# Patient Record
Sex: MALE | Race: WHITE
[De-identification: names, ages, dates, MRNs, and addresses within clinical notes are randomized per-mention and may not be internally consistent; named-entity substitution may affect disease eponyms.]

---

## 2019-04-16 ENCOUNTER — HOSPITAL ENCOUNTER (EMERGENCY)
Dept: HOSPITAL 56 - MW.ED | Age: 39
Discharge: HOME | End: 2019-04-16
Payer: MEDICAID

## 2019-04-16 VITALS — DIASTOLIC BLOOD PRESSURE: 76 MMHG | SYSTOLIC BLOOD PRESSURE: 124 MMHG

## 2019-04-16 DIAGNOSIS — Z79.899: ICD-10-CM

## 2019-04-16 DIAGNOSIS — M54.42: ICD-10-CM

## 2019-04-16 DIAGNOSIS — I25.2: ICD-10-CM

## 2019-04-16 DIAGNOSIS — M51.26: Primary | ICD-10-CM

## 2019-04-16 PROCEDURE — 96372 THER/PROPH/DIAG INJ SC/IM: CPT

## 2019-04-16 PROCEDURE — 99283 EMERGENCY DEPT VISIT LOW MDM: CPT

## 2019-04-16 RX ADMIN — KETOROLAC TROMETHAMINE ONE MG: 60 INJECTION, SOLUTION INTRAMUSCULAR at 09:49

## 2019-04-16 NOTE — EDM.PDOC
ED HPI GENERAL MEDICAL PROBLEM





- General


Chief Complaint: Lower Extremity Injury/Pain


Stated Complaint: LEFT LEG PAIN


Time Seen by Provider: 04/16/19 10:29


Source of Information: Reports: Patient





- History of Present Illness


INITIAL COMMENTS - FREE TEXT/NARRATIVE: 





HISTORY AND PHYSICAL:


History of present illness:


[]Patient presents with low back pain with history of disc bulge recent MRI 

within the last month performed in [place] he does have a copy of the MRI report 

he has sciatic distribution pain on the left 10 out of 10 and is not been able 

to sleep. 2 days while he has been with pain management here previously he 

moved to [place] for a short-term but has returned to [place] due to 

financial resources available last job availability





No footdrop saddle anesthesia bowel or urine symptoms





Review of systems: 


As per history of present illness and below otherwise all systems reviewed and 

negative.


Past medical history: 


As per history of present illness and as reviewed below otherwise 

noncontributory.


Surgical history: 


As per history of present illness and as reviewed below otherwise 

noncontributory.


Social history: 


No reported history of drug or alcohol abuse.


Family history: 


As per history of present illness and as reviewed below otherwise 

noncontributory.


Physical exam:


HEENT: Atraumatic, normocephalic, pupils reactive, negative for conjunctival 

pallor or scleral icterus, mucous membranes moist, throat clear, neck supple, 

nontender, trachea midline.


Lungs: Clear to auscultation, breath sounds equal bilaterally, chest nontender.


Heart: S1S2, regular, negative for clicks, rubs, or JVD.


Abdomen: Soft, nondistended, nontender. Negative for masses or 

hepatosplenomegaly. Negative for costovertebral tenderness.


Pelvis: Stable nontender.


Genitourinary: Deferred.


Rectal: Deferred.


Extremities: Atraumatic, negative for cords or calf pain. Neurovascular 

unremarkable.


Neuro: Awake, alert, oriented. Cranial nerves II through XII unremarkable. 

Cerebellum unremarkable. Motor and sensory unremarkable throughout. Exam 

nonfocal.


Diagnostics:


[I will make a copy of his MRI report for file


]


Therapeutics:


[Patient has had multiple neurology and neurosurgery consultation, he is going 

to follow [facility] with [facility] neuro[facility] department]


[place]


Medrol Dosepak





Impression: 


[Low back pain


Disc bulge L4-L5


Sciatic distribution pain secondary to above 


]


Definitive disposition and diagnosis as appropriate pending reevaluation and 

review of above.


  ** left leg


Pain Score (Numeric/FACES): 8





- Related Data


 Allergies











Allergy/AdvReac Type Severity Reaction Status Date / Time


 


No Known Allergies Allergy   Verified 04/16/19 09:42











Home Meds: 


 Home Meds





Lisinopril 1 tab PO DAILY 04/16/19 [History]


atorvaSTATin [Lipitor] 10 mg PO DAILY 04/16/19 [History]











Past Medical History


HEENT History: Reports: None


Cardiovascular History: Reports: MI


Respiratory History: Reports: None


Gastrointestinal History: Reports: None


Genitourinary History: Reports: None


Musculoskeletal History: Reports: None


Neurological History: Reports: None


Psychiatric History: Reports: None


Endocrine/Metabolic History: Reports: None


Hematologic History: Reports: None


Immunologic History: Reports: None


Oncologic (Cancer) History: Reports: None


Dermatologic History: Reports: None





- Infectious Disease History


Infectious Disease History: Reports: Chicken Pox





- Past Surgical History


Head Surgeries/Procedures: Reports: None


HEENT Surgical History: Reports: None


Cardiovascular Surgical History: Reports: Coronary Artery Stent


Respiratory Surgical History: Reports: None


GI Surgical History: Reports: None


Male  Surgical History: Reports: None


Endocrine Surgical History: Reports: None


Neurological Surgical History: Reports: None


Musculoskeletal Surgical History: Reports: Other (See Below)


Other Musculoskeletal Surgeries/Procedures:: Left elbow and right ankle surgery

, back surgery


Oncologic Surgical History: Reports: None


Dermatological Surgical History: Reports: None





Social & Family History





- Family History


Family Medical History: Noncontributory





- Tobacco Use


Smoking Status *Q: Never Smoker


Second Hand Smoke Exposure: No





- Caffeine Use


Caffeine Use: Reports: Coffee





- Recreational Drug Use


Recreational Drug Use: No





Review of Systems





- Review of Systems


Review Of Systems: See Below





ED EXAM, GENERAL





- Physical Exam


Exam: See Below





Course





- Vital Signs


Last Recorded V/S: 





 Last Vital Signs











Temp  97.1 F   04/16/19 09:39


 


Pulse  107 H  04/16/19 09:39


 


Resp  18   04/16/19 09:39


 


BP  118/81   04/16/19 09:39


 


Pulse Ox  97   04/16/19 09:39














- Orders/Labs/Meds


Meds: 





Medications














Discontinued Medications














Generic Name Dose Route Start Last Admin





  Trade Name Freq  PRN Reason Stop Dose Admin


 


Ketorolac Tromethamine  60 mg  04/16/19 09:44  04/16/19 09:49





  Toradol  IM  04/16/19 09:45  60 mg





  ONETIME ONE   Administration





     





     





     





     














Departure





- Departure


Time of Disposition: 10:31


Disposition: Home, Self-Care 01


Condition: Good


Clinical Impression: 


 Low back pain, Bulging discs








- Discharge Information


Referrals: 


PCP,None [Primary Care Provider] - 


Additional Instructions: 


Medication as prescribed


Return if symptoms persist or worsen


Follow-up  neurology/neurosurgeon is discussed


Consider pain management


Establish primary care provider





[facility] - Primary Care


[address] [place] [id number]


Phone: [phone]


Fax: [phone]








The following information is given to patients seen in the emergency department 

who are being discharged to home. This information is to outline your options 

for follow-up care. We provide all patients seen in our emergency department 

with a follow-up referral.





The need for follow-up, as well as the timing and circumstances, are variable 

depending upon the specifics of your emergency department visit.





If you don't have a primary care physician on staff, we will provide you with a 

referral. We always advise you to contact your personal physician following an 

emergency department visit to inform them of the circumstance of the visit and 

for follow-up with them and/or the need for any referrals to a consulting 

specialist.





The emergency department will also refer you to a specialist when appropriate. 

This referral assures that you have the opportunity for follow-up care with a 

specialist. All of these measure are taken in an effort to provide you with 

optimal care, which includes your follow-up.





Under all circumstances we always encourage you to contact your private 

physician who remains a resource for coordinating your care. When calling for 

follow-up care, please make the office aware that this follow-up is from your 

recent emergency room visit. If for any reason you are refused follow-up, 

please contact the [facility] emergency department at [phone] 

and asked to speak to the emergency department charge nurse.

## 2019-07-11 ENCOUNTER — HOSPITAL ENCOUNTER (EMERGENCY)
Dept: HOSPITAL 56 - MW.ED | Age: 39
Discharge: HOME | End: 2019-07-11
Payer: COMMERCIAL

## 2019-07-11 VITALS — DIASTOLIC BLOOD PRESSURE: 84 MMHG | SYSTOLIC BLOOD PRESSURE: 125 MMHG

## 2019-07-11 DIAGNOSIS — M54.5: ICD-10-CM

## 2019-07-11 DIAGNOSIS — G89.29: Primary | ICD-10-CM

## 2019-07-11 DIAGNOSIS — Z79.899: ICD-10-CM

## 2019-07-11 DIAGNOSIS — I25.2: ICD-10-CM

## 2019-07-11 PROCEDURE — 99283 EMERGENCY DEPT VISIT LOW MDM: CPT

## 2019-07-11 PROCEDURE — 96372 THER/PROPH/DIAG INJ SC/IM: CPT

## 2019-07-11 NOTE — EDM.PDOC
ED HPI GENERAL MEDICAL PROBLEM





- General


Chief Complaint: Back Pain or Injury


Stated Complaint: PT HAS BODY PAIN


Time Seen by Provider: 07/11/19 18:19


Source of Information: Reports: Patient


History Limitations: Reports: No Limitations





- History of Present Illness


INITIAL COMMENTS - FREE TEXT/NARRATIVE: 


HISTORY AND PHYSICAL:





History of present illness:


Patient is a 38-year-old male who presents to the emergency room with 

complaints of chronic left low back pain that radiates into his left leg. He 

states he has had chronic back pain for several years, history of laminectomy 

2. States he does see a primary care provider and has had previous imaging 

including MRI. States his back pain is usually controlled with over-the-counter 

medications and routine stretching. When he "gets a flare" he does seek pain 

medication through muscle relaxers or narcotics. This "flair" has been x 2 

days. He denies any recent injury, trauma or falls. States the pain is similar 

to previous episodes and declines the need for any diagnostics at this time.





Patient denies any fever, chills, headache, change in vision, syncope or near 

syncope. Denies any chest pain, shortness of breath or cough. Denies any 

abdominal pain, nausea, vomiting, diarrhea, constipation or dysuria. Has not 

noted any blood in urine or stool. Patient has been eating and drinking 

appropriately.





Review of systems: 


As per history of present illness and below otherwise all systems reviewed and 

negative.





Past medical history: 


As per history of present illness and as reviewed below otherwise 

noncontributory.





Surgical history: 


As per history of present illness and as reviewed below otherwise 

noncontributory.





Social history: 


See social history for further information





Family history: 


As per history of present illness and as reviewed below otherwise 

noncontributory.





Physical exam:


General: Well-developed and well-nourished 38-year-old male. Alert and 

oriented. Nontoxic appearing and in no acute distress.


HEENT: Atraumatic, normocephalic, pupils equal and reactive bilaterally, 

negative for conjunctival pallor or scleral icterus, mucous membranes moist, 

trachea midline. No drooling or trismus noted. No meningeal signs. No hot 

potato voice noted. 


Lungs: Clear to auscultation, breath sounds equal bilaterally, chest nontender.


Heart: S1S2, regular rate and rhythm without overt murmur


Abdomen: Soft, nondistended, nontender. Negative for masses or 

hepatosplenomegaly. Negative for costovertebral tenderness.


Pelvis: Stable nontender.


Genitourinary: Deferred.


Rectal: Deferred.


C-spine/Back: No pinpoint vertebral tenderness upon palpation. No crepitus, step

-offs or obvious deformities. Left low lumbar paraspinous muscular tenderness 

going into the glue. There is a midline scar to his lumbar region. Patient is 

fully ambulatory and able to walk on his heels and toes without difficulty. He 

denies any numbness, tingling or saddle paresthesias. Denies any urinary or 

fecal incontinence.


Skin: Intact, warm, dry. No lesions or rashes noted.


Extremities: Atraumatic, moves all extremities per self without difficulty or 

deficits, negative for cords or calf pain. Neurovascular unremarkable.


Neuro: Awake, alert, oriented. Cranial nerves II through XII unremarkable. 

Cerebellum unremarkable. Motor and sensory unremarkable throughout. Exam 

nonfocal.





Notes:


Patient declines need for imaging. He does have a report of a recent MRI on his 

phone. Does show some bulging disks with no stenosis. Indication education was 

reviewed and discussed. Supportive care measures were reviewed and discussed. 

Voices understanding and is agreeable to plan of care. Denies any further 

questions or concerns at this time.





Diagnostics:


Declines





Therapeutics:


Toradol and Norflex





Prescription:


Norco





Impression: 


Chronic lumbar back pain





Plan:


1. The medication he received as an injection today does cause drowsiness so do 

not drive for the remaining day


2. When resting please lay on a flat firm surface. Limit your immobility to 

prevent muscle stiffness, get up to ambulate/move around/gentle stretching 

multiple times throughout the day. May alternate heat and ice to the painful 

areas


3. Tylenol and/or ibuprofen as needed for back pain. Norco as directed, this 

medication may cause drowsiness a do not take it will driving her needing to be 

functioning outside of the house.


4. Please follow-up with your primary care provider as we discussed.Return to 

the ED as needed and as discussed.





Definitive disposition and diagnosis as appropriate pending reevaluation and 

review of above.





  ** Lower back


Pain Score (Numeric/FACES): 8





- Related Data


 Allergies











Allergy/AdvReac Type Severity Reaction Status Date / Time


 


No Known Allergies Allergy   Verified 07/11/19 18:33











Home Meds: 


 Home Meds





atorvaSTATin [Lipitor] 20 mg PO DAILY 04/16/19 [History]


Hydrocodone/Acetaminophen [Norco 5-325 Tablet] 1 dose PO Q4HR PRN #30 tablet 07/ 11/19 [Rx]


methylPREDNISolone [Medrol] 1 dose PO ASDIRECTED #1 tab.ds.pk 07/11/19 [Rx]











Past Medical History


HEENT History: Reports: None


Cardiovascular History: Reports: MI


Respiratory History: Reports: None


Gastrointestinal History: Reports: None


Genitourinary History: Reports: None


Musculoskeletal History: Reports: None


Neurological History: Reports: None


Psychiatric History: Reports: None


Endocrine/Metabolic History: Reports: None


Hematologic History: Reports: None


Immunologic History: Reports: None


Oncologic (Cancer) History: Reports: None


Dermatologic History: Reports: None





- Infectious Disease History


Infectious Disease History: Reports: Chicken Pox





- Past Surgical History


Head Surgeries/Procedures: Reports: None


HEENT Surgical History: Reports: None


Cardiovascular Surgical History: Reports: Coronary Artery Stent


Respiratory Surgical History: Reports: None


GI Surgical History: Reports: None


Male  Surgical History: Reports: None


Endocrine Surgical History: Reports: None


Neurological Surgical History: Reports: None


Musculoskeletal Surgical History: Reports: Other (See Below)


Other Musculoskeletal Surgeries/Procedures:: Left elbow and right ankle surgery

, back surgery


Oncologic Surgical History: Reports: None


Dermatological Surgical History: Reports: None





Social & Family History





- Family History


Family Medical History: Noncontributory





- Caffeine Use


Caffeine Use: Reports: Coffee





ED ROS GENERAL





- Review of Systems


Review Of Systems: ROS reveals no pertinent complaints other than HPI.





ED EXAM,LOWER BACK PAIN/INJURY





- Physical Exam


Exam: See Below (See dictation)





Course





- Vital Signs


Last Recorded V/S: 


 Last Vital Signs











Temp  97.2 F   07/11/19 18:33


 


Pulse  80   07/11/19 18:33


 


Resp  18   07/11/19 18:33


 


BP  148/87 H  07/11/19 18:33


 


Pulse Ox  98   07/11/19 18:33














- Orders/Labs/Meds


Meds: 


Medications














Discontinued Medications














Generic Name Dose Route Start Last Admin





  Trade Name Freq  PRN Reason Stop Dose Admin


 


Ketorolac Tromethamine  60 mg  07/11/19 18:41  





  Toradol  IM  07/11/19 18:42  





  ONETIME ONE   





     





     





     





     


 


Orphenadrine Citrate  60 mg  07/11/19 18:41  





  Norflex  IM  07/11/19 18:42  





  NOW STA   





     





     





     





     














Departure





- Departure


Time of Disposition: 18:45


Disposition: Home, Self-Care 01


Clinical Impression: 


 Chronic back pain








- Discharge Information


Prescriptions: 


Hydrocodone/Acetaminophen [Norco 5-325 Tablet] 1 dose PO Q4HR PRN #30 tablet


 PRN Reason: Pain


methylPREDNISolone [Medrol] 1 dose PO ASDIRECTED #1 tab.ds.pk


Instructions:  Chronic Back Pain, Easy-to-Read


Referrals: 


PCP,None [Primary Care Provider] - 


Forms:  ED Department Discharge


Additional Instructions: 


The following information is given to patients seen in the emergency department 

who are being discharged to home. This information is to outline your options 

for follow-up care. We provide all patients seen in our emergency department 

with a follow-up referral.





The need for follow-up, as well as the timing and circumstances, are variable 

depending upon the specifics of your emergency department visit.





If you don't have a primary care physician on staff, we will provide you with a 

referral. We always advise you to contact your personal physician following an 

emergency department visit to inform them of the circumstance of the visit and 

for follow-up with them and/or the need for any referrals to a consulting 

specialist.





The emergency department will also refer you to a specialist when appropriate. 

This referral assures that you have the opportunity for follow-up care with a 

specialist. All of these measure are taken in an effort to provide you with 

optimal care, which includes your follow-up.





Under all circumstances we always encourage you to contact your private 

physician who remains a resource for coordinating your care. When calling for 

follow-up care, please make the office aware that this follow-up is from your 

recent emergency room visit. If for any reason you are refused follow-up, 

please contact the [facility] Emergency 

Department at [phone] and asked to speak to the emergency department 

charge nurse.





[facility]


Primary Care


[address] [address]


Phone: [phone]


Fax: [phone]





[facility]


[address] [place] [id number]


Phone: [phone]


Fax: [phone]





1. Please follow up with your primary care and the specialist as we discussed.


2. When resting please lay on a flat firm surface. Limit your immobility to 

prevent muscle stiffness, get up to ambulate/move around/gentle stretching 

multiple times throughout the day. May alternate heat and ice to the painful 

areas


3. Tylenol and/or ibuprofen as needed for back pain. Norco as directed, this 

medication may cause drowsiness a do not take it will driving her needing to be 

functioning outside of the house.


4. Return to the ED as needed and as discussed.